# Patient Record
Sex: MALE | Race: WHITE | Employment: UNEMPLOYED | URBAN - METROPOLITAN AREA
[De-identification: names, ages, dates, MRNs, and addresses within clinical notes are randomized per-mention and may not be internally consistent; named-entity substitution may affect disease eponyms.]

---

## 2024-06-22 ENCOUNTER — OFFICE VISIT (OUTPATIENT)
Dept: URGENT CARE | Age: 84
End: 2024-06-22

## 2024-06-22 VITALS
DIASTOLIC BLOOD PRESSURE: 77 MMHG | HEART RATE: 60 BPM | TEMPERATURE: 98.3 F | WEIGHT: 203 LBS | SYSTOLIC BLOOD PRESSURE: 133 MMHG | BODY MASS INDEX: 28.42 KG/M2 | OXYGEN SATURATION: 97 % | HEIGHT: 71 IN

## 2024-06-22 DIAGNOSIS — S61.209A FINGER AVULSION, INITIAL ENCOUNTER: Primary | ICD-10-CM

## 2024-06-22 RX ORDER — DOXYCYCLINE HYCLATE 100 MG
100 TABLET ORAL 2 TIMES DAILY
Qty: 14 TABLET | Refills: 0 | Status: SHIPPED | OUTPATIENT
Start: 2024-06-22 | End: 2024-06-29

## 2024-06-22 RX ORDER — DOXYCYCLINE HYCLATE 100 MG
100 TABLET ORAL 2 TIMES DAILY
Qty: 14 TABLET | Refills: 0 | Status: SHIPPED | OUTPATIENT
Start: 2024-06-22 | End: 2024-06-22 | Stop reason: SDUPTHER

## 2024-06-22 NOTE — PATIENT INSTRUCTIONS
Wash daily with mild soap and water. Monitor for signs or symptoms of infection including warmth, redness, drainage. Take antibiotic until completed.   Use Vaseline to prevent dressing from sticking to wound  Keep clean and dry until healed

## 2024-06-22 NOTE — PROGRESS NOTES
Wellington Reyes (:  1940) is a 83 y.o. male,New patient, here for evaluation of the following chief complaint(s):  Finger Laceration (Patient injured his R index finger on a \"grinding wheel\" approx an hour ago. Up to date on tdap. )      ASSESSMENT/PLAN:    ICD-10-CM    1. Finger avulsion, initial encounter  S61.209A doxycycline hyclate (VIBRA-TABS) 100 MG tablet     DISCONTINUED: doxycycline hyclate (VIBRA-TABS) 100 MG tablet        Finger cleansed with chlorhexidine, bleeding controlled. DSD and bacitracin applied.      Patient/Family verbalized understanding of printed and verbal discharge instructions including follow up care.      Follow up in 7 days if symptoms persist or if symptoms worsen.    SUBJECTIVE/OBJECTIVE:  Hit right index finger on a grinding wheel. Finger wrapped in cloth, bleeding.       History provided by:  Patient          Vitals:    24 1523   BP: 133/77   Site: Left Upper Arm   Position: Sitting   Cuff Size: Medium Adult   Pulse: 60   Temp: 98.3 °F (36.8 °C)   TempSrc: Oral   SpO2: 97%   Weight: 92.1 kg (203 lb)   Height: 1.803 m (5' 11\")       Review of Systems   Musculoskeletal:  Positive for joint swelling.       Physical Exam  Vitals and nursing note reviewed.   Constitutional:       Appearance: Normal appearance.   Eyes:      Conjunctiva/sclera: Conjunctivae normal.   Cardiovascular:      Rate and Rhythm: Normal rate and regular rhythm.   Pulmonary:      Effort: Pulmonary effort is normal.      Breath sounds: Normal breath sounds.   Skin:     General: Skin is warm and dry.      Findings: Signs of injury and wound present.      Comments: Avulsion of index finger distal, about 1 cm.   Neurological:      Mental Status: He is alert and oriented to person, place, and time.   Psychiatric:         Behavior: Behavior normal.         An electronic signature was used to authenticate this note.    --MEAGHAN Pereira - CNP